# Patient Record
Sex: FEMALE | Race: WHITE
[De-identification: names, ages, dates, MRNs, and addresses within clinical notes are randomized per-mention and may not be internally consistent; named-entity substitution may affect disease eponyms.]

---

## 2017-06-26 ENCOUNTER — HOSPITAL ENCOUNTER (INPATIENT)
Dept: HOSPITAL 25 - MCHOBOUT | Age: 32
LOS: 2 days | Discharge: HOME | DRG: 560 | End: 2017-06-28
Attending: MIDWIFE | Admitting: MIDWIFE
Payer: COMMERCIAL

## 2017-06-26 DIAGNOSIS — K64.9: ICD-10-CM

## 2017-06-26 DIAGNOSIS — O32.6XX0: Primary | ICD-10-CM

## 2017-06-26 DIAGNOSIS — O48.0: ICD-10-CM

## 2017-06-26 DIAGNOSIS — Z3A.41: ICD-10-CM

## 2017-06-26 PROCEDURE — 90707 MMR VACCINE SC: CPT

## 2017-06-26 PROCEDURE — 0KQM0ZZ REPAIR PERINEUM MUSCLE, OPEN APPROACH: ICD-10-PCS | Performed by: MIDWIFE

## 2017-06-26 PROCEDURE — 10907ZC DRAINAGE OF AMNIOTIC FLUID, THERAPEUTIC FROM PRODUCTS OF CONCEPTION, VIA NATURAL OR ARTIFICIAL OPENING: ICD-10-PCS | Performed by: MIDWIFE

## 2017-06-26 PROCEDURE — 36415 COLL VENOUS BLD VENIPUNCTURE: CPT

## 2017-06-26 PROCEDURE — 85025 COMPLETE CBC W/AUTO DIFF WBC: CPT

## 2017-06-26 PROCEDURE — 4A1HX4Z MONITORING OF PRODUCTS OF CONCEPTION, CARDIAC ELECTRICAL ACTIVITY, EXTERNAL APPROACH: ICD-10-PCS | Performed by: MIDWIFE

## 2017-06-27 RX ADMIN — DOCUSATE SODIUM SCH MG: 100 CAPSULE, LIQUID FILLED ORAL at 20:52

## 2017-06-27 RX ADMIN — IBUPROFEN PRN MG: 600 TABLET, FILM COATED ORAL at 00:56

## 2017-06-27 RX ADMIN — IBUPROFEN PRN MG: 600 TABLET, FILM COATED ORAL at 16:14

## 2017-06-27 RX ADMIN — DOCUSATE SODIUM SCH MG: 100 CAPSULE, LIQUID FILLED ORAL at 07:40

## 2017-06-27 RX ADMIN — IBUPROFEN PRN MG: 600 TABLET, FILM COATED ORAL at 07:40

## 2017-06-27 RX ADMIN — WITCH HAZEL PRN PKT: 5 CLOTH TOPICAL at 02:02

## 2017-06-27 RX ADMIN — DOCUSATE SODIUM SCH: 100 CAPSULE, LIQUID FILLED ORAL at 15:21

## 2017-06-27 RX ADMIN — DIBUCAINE PRN APPLIC: 1 OINTMENT TOPICAL at 02:02

## 2017-06-28 VITALS — DIASTOLIC BLOOD PRESSURE: 77 MMHG | SYSTOLIC BLOOD PRESSURE: 133 MMHG

## 2017-06-28 LAB
HCT VFR BLD AUTO: 31 % (ref 35–47)
HGB BLD-MCNC: 10.6 G/DL (ref 12–16)
MCH RBC QN AUTO: 29 PG (ref 27–31)
MCHC RBC AUTO-ENTMCNC: 34 G/DL (ref 31–36)
MCV RBC AUTO: 87 FL (ref 80–97)
RBC # BLD AUTO: 3.61 10^6/UL (ref 4–5.4)
WBC # BLD AUTO: 13.4 10^3/UL (ref 3.5–10.8)

## 2017-06-28 RX ADMIN — DIBUCAINE PRN APPLIC: 1 OINTMENT TOPICAL at 12:30

## 2017-06-28 RX ADMIN — WITCH HAZEL PRN PKT: 5 CLOTH TOPICAL at 04:51

## 2017-06-28 RX ADMIN — IBUPROFEN PRN MG: 600 TABLET, FILM COATED ORAL at 04:46

## 2017-06-28 RX ADMIN — DOCUSATE SODIUM SCH MG: 100 CAPSULE, LIQUID FILLED ORAL at 08:34

## 2017-06-28 RX ADMIN — WITCH HAZEL PRN PKT: 5 CLOTH TOPICAL at 12:30

## 2019-10-04 ENCOUNTER — HOSPITAL ENCOUNTER (INPATIENT)
Dept: HOSPITAL 25 - MCHOBOUT | Age: 34
LOS: 1 days | Discharge: HOME | DRG: 560 | End: 2019-10-05
Attending: MIDWIFE | Admitting: MIDWIFE
Payer: COMMERCIAL

## 2019-10-04 DIAGNOSIS — Z3A.41: ICD-10-CM

## 2019-10-04 DIAGNOSIS — O48.0: Primary | ICD-10-CM

## 2019-10-04 PROCEDURE — 85025 COMPLETE CBC W/AUTO DIFF WBC: CPT

## 2019-10-04 PROCEDURE — 80307 DRUG TEST PRSMV CHEM ANLYZR: CPT

## 2019-10-04 PROCEDURE — 36415 COLL VENOUS BLD VENIPUNCTURE: CPT

## 2019-10-04 PROCEDURE — 0HQ9XZZ REPAIR PERINEUM SKIN, EXTERNAL APPROACH: ICD-10-PCS | Performed by: MIDWIFE

## 2019-10-04 RX ADMIN — DOCUSATE SODIUM SCH MG: 100 CAPSULE, LIQUID FILLED ORAL at 20:04

## 2019-10-04 RX ADMIN — IBUPROFEN PRN MG: 600 TABLET, FILM COATED ORAL at 11:54

## 2019-10-04 RX ADMIN — DOCUSATE SODIUM SCH MG: 100 CAPSULE, LIQUID FILLED ORAL at 08:19

## 2019-10-04 RX ADMIN — ACETAMINOPHEN PRN MG: 325 TABLET ORAL at 14:32

## 2019-10-04 RX ADMIN — DIBUCAINE PRN APPLIC: 1 OINTMENT TOPICAL at 05:17

## 2019-10-04 RX ADMIN — ACETAMINOPHEN PRN MG: 325 TABLET ORAL at 20:04

## 2019-10-04 RX ADMIN — IBUPROFEN PRN MG: 600 TABLET, FILM COATED ORAL at 20:02

## 2019-10-04 RX ADMIN — ACETAMINOPHEN PRN MG: 325 TABLET ORAL at 10:40

## 2019-10-04 RX ADMIN — DOCUSATE SODIUM SCH MG: 100 CAPSULE, LIQUID FILLED ORAL at 14:32

## 2019-10-04 RX ADMIN — IBUPROFEN PRN MG: 600 TABLET, FILM COATED ORAL at 05:18

## 2019-10-04 NOTE — HP
General Information





- Reason for Visit


Contractions since 9pm getting progressively stronger, + FM, + bloody show - VB

, - LOF. 








- General Information


Maternal Age: 32


Grav: 1


Para: 0


SAB: 0


IEA: 0





Estimated Due Date: 17


Determined By: LMP


Gestational Age in Weeks/Days: 41.1


Maternal Blood Type and Rh: O Positive





- Results this Pregnancy


Serology/RPR Result: Non-Reactive


Rubella Result: Immune


HBsAg Result: Negative


HIV Result: Negative


GBS Culture Result: Negative





Past Medical History


Delivery History: Hx Uncomplicated Vaginal Delivery


Pertinent Past Medical History: Non-Contributory


Pertinent Past Surgical History: None


Pertinent Family History: See  Records - PGF: stomach CA; P uncle: DM; 

MGM: br CA, DM; MGF: CVD; F: Prostate CA; PGF: Stroke





- Antepartal Records


Antepartal Records: Reviewed, Pregnancy Uncomplicated





Review of Systems


Constitutional: Uncomfortable


CV Complaint: No


Respiratory: Shortness of Breath: No


Gastrointestinal: No Nausea/Vomiting, Normal Bowel Movement


Genitourinary: No Dysuria, No Leaking Fluid, Spotting


Musculoskeletal: No Epigastric Pain, Contractions


Neurological: No Headache, No Visual Changes


Fetal Movement: Normal





Exam


Allergies/Adverse Reactions: 


Allergies





No Known Allergies Allergy (Verified 17 13:59)


 











BP:139/81





- Measurements


Height: 5 ft 10 in


Weight: 234 lb


Body Mass Index (BMI): 33.5


Pre-Pregnancy Weight: 209 lb





- Exam


Breast: Breast Exam Deferred


CVA: No CVA Tenderness


Extremities: No Edema


Heart: Normal Rhythm/Heart Sounds


HEENT: No Significant Findings


Lungs: Clear Bilaterally


Rectal: Rectal Exam Deferred


Reflexes: DTR 2+


Thyroid: No Thyromegaly





- Abdominal Exam


Abdomen Exam: Fundal Height Consistent with Dates





- Ultrasound/Biophysical Profile


Ultrasound Status: Not Done





Targeted Exam Findings


Estimated Fetal Weight: 7lbs 10oz


Cervical Exam: 8cm


Effacement: 100%


Station: +1


Presenting Part: Vertex


Membrane Status: Bulging


Bleeding/Discharge: Bloody Show





EFM Findings





- External Fetal Monitor Findings


Baseline Fetal Heart Rate: 140


External Fetal Monitor Findings: Accelerations Present, No Pattern of Variable 

or Late Decelerations, Variability Moderate, Baseline Stable


Contractions: Regular, Moderate, Strong, 45-90 Seconds





Assessment/Plan





- Assessment





34 y.o. , 41w1d EGA, active labor





- Plan


Plan: Admit - Anticipate Vaginal Delivery





- Date/Time of Admission


Date of Admission: 10/04/19


Time of Admission: 03:30

## 2019-10-04 NOTE — PROCNOTE
[facility] OB: Delivery Note





- Delivery


  ** A


Date of Birth: [birth year] - ELIE:2019, 41w1d EGA


Time of Birth: 04:[birth year]


[place] Sex: Male


Apgar Score 1 Minute: 8


Apgar Score 5 Minutes: 9


Gestational Age in Weeks and Days at Delivery: 160 Weeks and 2 Days


Delivery Method: Spontaneous Vaginal


Labor: Spontaneous


Amniotic Fluid: Meconium


Estimated Blood Loss: 200


Anesthesia/Analgesia: None


Delivered By: Sherry Dixon





- Nursery


Level of Nursery: Regular/Bedside





- Perineum


Perineal Injury: Perineal Laceration, 1st Degree


Perineal Repair: By Delivering Practioner

## 2019-10-05 VITALS — DIASTOLIC BLOOD PRESSURE: 80 MMHG | SYSTOLIC BLOOD PRESSURE: 117 MMHG

## 2019-10-05 LAB
BASOPHILS # BLD AUTO: 0 10^3/UL (ref 0–0.2)
EOSINOPHIL # BLD AUTO: 0.1 10^3/UL (ref 0–0.6)
HCT VFR BLD AUTO: 36 % (ref 35–47)
HGB BLD-MCNC: 12.3 G/DL (ref 12–16)
LYMPHOCYTES # BLD AUTO: 1.9 10^3/UL (ref 1–4.8)
MCH RBC QN AUTO: 29 PG (ref 27–31)
MCHC RBC AUTO-ENTMCNC: 34 G/DL (ref 31–36)
MCV RBC AUTO: 86 FL (ref 80–97)
MONOCYTES # BLD AUTO: 0.7 10^3/UL (ref 0–0.8)
NEUTROPHILS # BLD AUTO: 9 10^3/UL (ref 1.5–7.7)
NRBC # BLD AUTO: 0 10^3/UL
NRBC BLD QL AUTO: 0
PLATELET # BLD AUTO: 198 10^3/UL (ref 150–450)
RBC # BLD AUTO: 4.19 10^6 /UL (ref 3.7–4.87)
WBC # BLD AUTO: 11.6 10^3/UL (ref 3.5–10.8)

## 2019-10-05 RX ADMIN — DIBUCAINE PRN APPLIC: 1 OINTMENT TOPICAL at 04:46

## 2019-10-05 RX ADMIN — IBUPROFEN PRN MG: 600 TABLET, FILM COATED ORAL at 04:43

## 2019-10-05 RX ADMIN — DOCUSATE SODIUM SCH MG: 100 CAPSULE, LIQUID FILLED ORAL at 08:40

## 2019-10-05 RX ADMIN — ACETAMINOPHEN PRN MG: 325 TABLET ORAL at 04:43
